# Patient Record
Sex: FEMALE | Race: WHITE | NOT HISPANIC OR LATINO | Employment: FULL TIME | URBAN - METROPOLITAN AREA
[De-identification: names, ages, dates, MRNs, and addresses within clinical notes are randomized per-mention and may not be internally consistent; named-entity substitution may affect disease eponyms.]

---

## 2024-06-01 ENCOUNTER — OFFICE VISIT (OUTPATIENT)
Dept: URGENT CARE | Facility: CLINIC | Age: 59
End: 2024-06-01
Payer: COMMERCIAL

## 2024-06-01 VITALS
RESPIRATION RATE: 18 BRPM | BODY MASS INDEX: 29.16 KG/M2 | SYSTOLIC BLOOD PRESSURE: 124 MMHG | WEIGHT: 175 LBS | DIASTOLIC BLOOD PRESSURE: 76 MMHG | OXYGEN SATURATION: 97 % | HEART RATE: 73 BPM | HEIGHT: 65 IN | TEMPERATURE: 98 F

## 2024-06-01 DIAGNOSIS — R51.9 ACUTE NONINTRACTABLE HEADACHE, UNSPECIFIED HEADACHE TYPE: ICD-10-CM

## 2024-06-01 DIAGNOSIS — R55 NEAR SYNCOPE: Primary | ICD-10-CM

## 2024-06-01 LAB
BILIRUB UR QL STRIP: NEGATIVE
CTP QC/QA: YES
GLUCOSE SERPL-MCNC: NORMAL MG/DL (ref 70–110)
GLUCOSE UR QL STRIP: NEGATIVE
KETONES UR QL STRIP: NEGATIVE
LEUKOCYTE ESTERASE UR QL STRIP: NEGATIVE
PH, POC UA: 7 (ref 5–8)
POC BLOOD, URINE: POSITIVE
POC NITRATES, URINE: NEGATIVE
PROT UR QL STRIP: POSITIVE
SARS-COV-2 AG RESP QL IA.RAPID: NEGATIVE
SP GR UR STRIP: 1.01 (ref 1–1.03)
UROBILINOGEN UR STRIP-ACNC: ABNORMAL (ref 0.1–1.1)

## 2024-06-01 PROCEDURE — 87811 SARS-COV-2 COVID19 W/OPTIC: CPT | Mod: QW,S$GLB,, | Performed by: NURSE PRACTITIONER

## 2024-06-01 PROCEDURE — 99204 OFFICE O/P NEW MOD 45 MIN: CPT | Mod: S$GLB,,, | Performed by: NURSE PRACTITIONER

## 2024-06-01 PROCEDURE — 82962 GLUCOSE BLOOD TEST: CPT | Mod: S$GLB,,, | Performed by: NURSE PRACTITIONER

## 2024-06-01 PROCEDURE — 93005 ELECTROCARDIOGRAM TRACING: CPT | Mod: S$GLB,,, | Performed by: NURSE PRACTITIONER

## 2024-06-01 PROCEDURE — 93010 ELECTROCARDIOGRAM REPORT: CPT | Mod: S$GLB,,, | Performed by: INTERNAL MEDICINE

## 2024-06-01 PROCEDURE — 81003 URINALYSIS AUTO W/O SCOPE: CPT | Mod: QW,S$GLB,, | Performed by: NURSE PRACTITIONER

## 2024-06-01 NOTE — PROGRESS NOTES
"Subjective:      Patient ID: Lachelle Healy is a 58 y.o. female.    Vitals:  height is 5' 5" (1.651 m) and weight is 79.4 kg (175 lb). Her oral temperature is 98 °F (36.7 °C). Her blood pressure is 124/76 and her pulse is 73. Her respiration is 18 and oxygen saturation is 97%.     Chief Complaint: Weakness    This is a 58 y.o. female who presents today with a chief complaint of a near-syncopal episode. Pt states she felt fine this morning when she woke up, and headed to [facility] (next door to this clinic). In [facility], at the counter-she felt like she would pass out, and they had to get a chair for her to sit in. They told her she was hot to the touch. She reported chills, dizziness, weakness, and fatigue. She did not lose consciousness. She denies illness.   She reported some chest pain that she has been having since she jumped out the pool, last week-in which she states she pulled rib muscles at bilateral front of chest. Denies palpitations, diaphoresis, left jaw and neck pain. She states she had felt some left arm paresthesia; and some lower back pain. Denies arm weakness, as she states she has good ROM of the arm, and good  strength.   Reports that stomach has felt upset. She has had multiple BM's today, that were not loose-but this is more than she usually stools. Denies vomiting or h/o constipation. States she normally drinks well-lots of water. Pt states she feels much better, now.         Fatigue  This is a new problem. The current episode started today. The problem has been unchanged. Associated symptoms include abdominal pain, chest pain, chills, fatigue, headaches and numbness. Pertinent negatives include no congestion, coughing, fever, rash, sore throat or vomiting. She has tried nothing for the symptoms.     Constitution: Positive for chills, fatigue and generalized weakness. Negative for fever.   HENT:  Negative for ear pain, congestion and sore throat.    Cardiovascular:  Positive for chest pain. Negative " for palpitations, sob on exertion and passing out.   Eyes:  Negative for vision loss, double vision and blurred vision.   Respiratory:  Negative for chest tightness, cough, COPD, shortness of breath, wheezing and asthma.    Gastrointestinal:  Positive for abdominal pain. Negative for vomiting, constipation and diarrhea.   Genitourinary:  Negative for dysuria and flank pain.   Musculoskeletal:  Positive for back pain (lower mid).   Skin:  Negative for rash.   Allergic/Immunologic: Negative for asthma.   Neurological:  Positive for dizziness, light-headedness, passing out, headaches, numbness and tingling. Negative for disorientation and altered mental status.   Psychiatric/Behavioral:  Negative for altered mental status, disorientation and confusion.       Objective:     Physical Exam   Constitutional: She is oriented to person, place, and time.  Non-toxic appearance. She does not appear ill. No distress.      Comments:Pt looks well. NAD. Good activity level.      HENT:   Head: Normocephalic.   Nose: Nose normal.   Mouth/Throat: Mucous membranes are moist. No oropharyngeal exudate or posterior oropharyngeal erythema. Oropharynx is clear.   Eyes: Right eye exhibits no discharge. Left eye exhibits no discharge.   Neck: Neck supple. No neck rigidity present.   Cardiovascular: Normal rate and regular rhythm.   Pulmonary/Chest: Effort normal and breath sounds normal. No respiratory distress. She has no wheezes. She has no rhonchi. She exhibits tenderness (costochondritis across bilateral anterior chest).   Abdominal: Normal appearance and bowel sounds are normal. She exhibits no distension. Soft. flat abdomen There is abdominal tenderness (generalized). There is no rebound, no guarding, no left CVA tenderness and no right CVA tenderness.   Musculoskeletal: Normal range of motion.         General: Normal range of motion.   Neurological: no focal deficit. She is alert and oriented to person, place, and time. She has normal  motor skills and normal sensation. She displays facial symmetry and no dysarthria. Gait and coordination normal. GCS eye subscore is 4. GCS verbal subscore is 5. GCS motor subscore is 6.   Skin: Skin is warm, dry, not diaphoretic and no rash. Capillary refill takes less than 2 seconds.   Psychiatric: Her behavior is normal. Mood normal.   Nursing note and vitals reviewed.    Results for orders placed or performed in visit on 06/01/24   POCT Urinalysis, Dipstick, Automated, W/O Scope   Result Value Ref Range    POC Blood, Urine Positive (A) Negative    POC Bilirubin, Urine Negative Negative    POC Urobilinogen, Urine NORM 0.1 - 1.1    POC Ketones, Urine Negative Negative    POC Protein, Urine Positive (A) Negative    POC Nitrates, Urine Negative Negative    POC Glucose, Urine Negative Negative    pH, UA 7.0 5 - 8    POC Specific Gravity, Urine 1.010 1.003 - 1.029    POC Leukocytes, Urine Negative Negative   SARS Coronavirus 2 Antigen, POCT Manual Read   Result Value Ref Range    SARS Coronavirus 2 Antigen Negative Negative     Acceptable Yes    POCT Glucose, Hand-Held Device   Result Value Ref Range    POC Glucose 127 mg/dL 70 - 110 MG/DL        EKG: normal sinus rhythm     Assessment:     1. Near syncope    2. Acute nonintractable headache, unspecified headache type        Plan:   Declines IVF and toradol for headache. She will take tylenol at home  Strict ER instructions given if symptoms return or persist.     Near syncope  -     POCT Urinalysis, Dipstick, Automated, W/O Scope  -     SARS Coronavirus 2 Antigen, POCT Manual Read  -     IN OFFICE EKG 12-LEAD (to Muse)  -     POCT Glucose, Hand-Held Device    Acute nonintractable headache, unspecified headache type      Patient Instructions   Stay well hydrated: alternate water with electrolytes  Eat well   Rest  Seek ER care if symptoms return or worsen           Additional MDM:     Heart Failure Score:   COPD = No

## 2024-06-01 NOTE — PATIENT INSTRUCTIONS
Stay well hydrated: alternate water with electrolytes  Eat well   Rest  Seek ER care if symptoms return or worsen

## 2024-06-02 LAB
OHS QRS DURATION: 80 MS
OHS QTC CALCULATION: 412 MS